# Patient Record
Sex: MALE | Race: NATIVE HAWAIIAN OR OTHER PACIFIC ISLANDER | HISPANIC OR LATINO | Employment: FULL TIME | URBAN - METROPOLITAN AREA
[De-identification: names, ages, dates, MRNs, and addresses within clinical notes are randomized per-mention and may not be internally consistent; named-entity substitution may affect disease eponyms.]

---

## 2024-10-06 ENCOUNTER — APPOINTMENT (OUTPATIENT)
Dept: GENERAL RADIOLOGY | Facility: HOSPITAL | Age: 34
End: 2024-10-06
Payer: COMMERCIAL

## 2024-10-06 ENCOUNTER — HOSPITAL ENCOUNTER (EMERGENCY)
Facility: HOSPITAL | Age: 34
Discharge: HOME | End: 2024-10-06
Attending: EMERGENCY MEDICINE | Admitting: EMERGENCY MEDICINE
Payer: COMMERCIAL

## 2024-10-06 VITALS
HEART RATE: 107 BPM | OXYGEN SATURATION: 97 % | DIASTOLIC BLOOD PRESSURE: 96 MMHG | WEIGHT: 220 LBS | RESPIRATION RATE: 18 BRPM | SYSTOLIC BLOOD PRESSURE: 150 MMHG | HEIGHT: 69 IN | BODY MASS INDEX: 32.58 KG/M2 | TEMPERATURE: 97.3 F

## 2024-10-06 DIAGNOSIS — S43.014A ANTERIOR DISLOCATION OF RIGHT SHOULDER, INITIAL ENCOUNTER: Primary | ICD-10-CM

## 2024-10-06 PROCEDURE — 25010000002 PROPOFOL 10 MG/ML EMULSION: Performed by: EMERGENCY MEDICINE

## 2024-10-06 PROCEDURE — 73030 X-RAY EXAM OF SHOULDER: CPT

## 2024-10-06 PROCEDURE — 73070 X-RAY EXAM OF ELBOW: CPT

## 2024-10-06 PROCEDURE — 25010000002 HYDROMORPHONE PER 4 MG: Performed by: EMERGENCY MEDICINE

## 2024-10-06 PROCEDURE — 96374 THER/PROPH/DIAG INJ IV PUSH: CPT

## 2024-10-06 PROCEDURE — 99285 EMERGENCY DEPT VISIT HI MDM: CPT

## 2024-10-06 RX ORDER — SODIUM CHLORIDE 0.9 % (FLUSH) 0.9 %
10 SYRINGE (ML) INJECTION AS NEEDED
Status: DISCONTINUED | OUTPATIENT
Start: 2024-10-06 | End: 2024-10-06 | Stop reason: HOSPADM

## 2024-10-06 RX ORDER — HYDROMORPHONE HYDROCHLORIDE 1 MG/ML
0.5 INJECTION, SOLUTION INTRAMUSCULAR; INTRAVENOUS; SUBCUTANEOUS ONCE
Status: COMPLETED | OUTPATIENT
Start: 2024-10-06 | End: 2024-10-06

## 2024-10-06 RX ORDER — PROPOFOL 10 MG/ML
VIAL (ML) INTRAVENOUS
Status: COMPLETED | OUTPATIENT
Start: 2024-10-06 | End: 2024-10-06

## 2024-10-06 RX ORDER — HYDROCODONE BITARTRATE AND ACETAMINOPHEN 5; 325 MG/1; MG/1
1 TABLET ORAL EVERY 6 HOURS PRN
Qty: 12 TABLET | Refills: 0 | Status: SHIPPED | OUTPATIENT
Start: 2024-10-06

## 2024-10-06 RX ADMIN — HYDROMORPHONE HYDROCHLORIDE 0.5 MG: 1 INJECTION, SOLUTION INTRAMUSCULAR; INTRAVENOUS; SUBCUTANEOUS at 14:39

## 2024-10-06 RX ADMIN — PROPOFOL 50 MG: 10 INJECTION, EMULSION INTRAVENOUS at 16:37

## 2024-10-06 RX ADMIN — PROPOFOL 50 MG: 10 INJECTION, EMULSION INTRAVENOUS at 16:39

## 2024-10-06 RX ADMIN — PROPOFOL 50 MG: 10 INJECTION, EMULSION INTRAVENOUS at 16:41

## 2024-10-06 NOTE — ED PROVIDER NOTES
EMERGENCY DEPARTMENT ENCOUNTER  Room Number:  25/25  PCP: Provider, No Known  Independent Historians: Patient and Family      HPI:  Chief Complaint: had concerns including Fall and Arm Injury.     A complete HPI/ROS/PMH/PSH/SH/FH are unobtainable due to: None    Chronic or social conditions impacting patient care (Social Determinants of Health): None      Context: Josr Guerra is a 33 y.o. male with a medical history of lateral epicondylitis who presents to the ED c/o acute fall and shoulder pain.  The patient reports that he fell just prior to arrival.  He reports severe pain to his right shoulder and right elbow.  He denies any other injury.  He reports decreased range of motion of his shoulder.      Review of prior external notes (non-ED) -and- Review of prior external test results outside of this encounter: Extensive review of the EPIC system as well as CareEverywhere reveals no prior visit notes and no prior diagnostic studies available for review.    Prescription drug monitoring program review: [facility] reviewed by Garfield Choe MD [facility] query complete and reviewed. Treatment plan to include limited course of prescribed controlled substance. Risks including addiction, benefits, and alternatives presented to patient.    PAST MEDICAL HISTORY  Active Ambulatory Problems     Diagnosis Date Noted    No Active Ambulatory Problems     Resolved Ambulatory Problems     Diagnosis Date Noted    No Resolved Ambulatory Problems     No Additional Past Medical History         PAST SURGICAL HISTORY  History reviewed. No pertinent surgical history.      FAMILY HISTORY  History reviewed. No pertinent family history.      SOCIAL HISTORY  Social History     Socioeconomic History    Marital status:          ALLERGIES  Penicillins      REVIEW OF SYSTEMS  Review of Systems  Included in HPI  All systems reviewed and negative except for those discussed in HPI.      PHYSICAL EXAM    I have reviewed the triage vital signs  and nursing notes.    ED Triage Vitals   Temp Heart Rate Resp BP SpO2   10/06/24 1342 10/06/24 1342 10/06/24 1342 10/06/24 1347 10/06/24 1342   97.3 °F (36.3 °C) 80 18 132/91 98 %      Temp src Heart Rate Source Patient Position BP Location FiO2 (%)   -- -- -- -- --              Physical Exam  GENERAL: Awake, alert, peers uncomfortable  SKIN: Warm, dry  HENT: Normocephalic, atraumatic  EYES: no scleral icterus  CV: regular rhythm, regular rate  RESPIRATORY: normal effort, lungs clear  ABDOMEN: soft, nontender, nondistended  MUSCULOSKELETAL: no deformity.  Tenderness to the right shoulder and right elbow.  No tenderness of the wrist.  No tenderness of the cervical spine.  No open wounds.  Distal pulses intact.  NEURO: alert, moves all extremities, follows commands            LAB RESULTS  No results found for this or any previous visit (from the past 24 hour(s)).      RADIOLOGY  XR Shoulder 2+ View Right    Result Date: 10/6/2024  XR SHOULDER 2+ VW RIGHT-  HISTORY: Postreduction.  COMPARISON: AP shoulder same date at 3:12 p.m.  FINDINGS: There has been reduction of anterior shoulder dislocation. There appears to be a Hill-Sachs lesion with cortical indentation of the posterolateral-superior humeral head.      Interval reduction of anterior shoulder dislocation. There appears to be a small Hill-Sachs lesion.  This report was finalized on 10/6/2024 5:43 PM by Mukesh Richards M.D on Workstation: [id number]      XR Elbow 2 View Right, XR Shoulder 2+ View Right    Result Date: 10/6/2024  RIGHT SHOULDER, RIGHT ELBOW  HISTORY: Fall with right shoulder pain and right elbow pain.  RIGHT SHOULDER: 2 VIEWS: Unfortunately, evaluation is limited in this patient and per the technologist these are the best images possible due to patient condition. These 2 images demonstrate findings suspicious for an anterior shoulder dislocation. This could also be correlated with clinical data and given limitation, CT may be the best means to  further evaluate. No fracture is evident.  RIGHT ELBOW: 4 VIEWS: Evaluation is limited as the elbow is held in significant flexion on the attempted lateral views. No direct lateral view is evident. There is no evidence for fracture or acute abnormality on these limited views. The soft tissues appear within normal limits.  This report was finalized on 10/6/2024 3:35 PM by Mukesh Richards M.D on Workstation: [id number]         MEDICATIONS GIVEN IN ER  Medications   sodium chloride 0.9 % flush 10 mL (has no administration in time range)   HYDROmorphone (DILAUDID) injection 0.5 mg (0.5 mg Intravenous Given 10/6/24 1439)   Propofol (DIPRIVAN) injection (50 mg Intravenous Given 10/6/24 1641)         ORDERS PLACED DURING THIS VISIT:  Orders Placed This Encounter   Procedures    Orthopedic Injury Treatment    Procedural Sedation    [facility] 03.  Sling & Swathe    XR Elbow 2 View Right    XR Shoulder 2+ View Right    XR Shoulder 2+ View Right    Monitor Blood Pressure    Insert Peripheral IV         OUTPATIENT MEDICATION MANAGEMENT:  Current Facility-Administered Medications Ordered in Epic   Medication Dose Route Frequency Provider Last Rate Last Admin    sodium chloride 0.9 % flush 10 mL  10 mL Intravenous PRN Garfield Choe MD         Current Outpatient Medications Ordered in Epic   Medication Sig Dispense Refill    HYDROcodone-acetaminophen (NORCO) 5-325 MG per tablet Take 1 tablet by mouth Every 6 (Six) Hours As Needed for Severe Pain. 12 tablet 0         PROCEDURES  Procedural Sedation    Date/Time: 10/6/2024 4:49 PM    Performed by: Garfield Choe MD  Authorized by: Garfield Choe MD    Consent:     Consent obtained:  Verbal and written    Consent given by:  Patient and spouse    Risks, benefits, and alternatives were discussed: yes      Risks discussed:  Allergic reaction, inadequate sedation, nausea, vomiting, respiratory compromise necessitating ventilatory assistance and intubation, prolonged  hypoxia resulting in organ damage and dysrhythmia  Universal protocol:     Procedure explained and questions answered to patient or proxy's satisfaction: yes      Imaging studies available: yes      Immediately prior to procedure, a time out was called: yes      Patient identity confirmed:  Verbally with patient, arm band and provided demographic data  Indications:     Procedure performed:  Dislocation reduction    Procedure necessitating sedation performed by:  Different physician    Intended level of sedation:  Moderate  Pre-sedation assessment:     Time since last food or drink:  >12 hours    ASA classification: class 1 - normal, healthy patient      Mouth opening:  3 or more finger widths    Thyromental distance:  4 finger widths    Mallampati score:  I - soft palate, uvula, fauces, pillars visible    Neck mobility: normal      Pre-sedation assessments completed and reviewed: airway patency, anesthesia/sedation history, cardiovascular function, hydration status, mental status, nausea/vomiting, pain level, respiratory function and temperature    Immediate pre-procedure details:     Reassessment: Patient reassessed immediately prior to procedure      Reviewed: vital signs and NPO status      Verified: bag valve mask available, emergency equipment available, intubation equipment available, IV patency confirmed, oxygen available and suction available    Procedure details (see MAR for exact dosages):     Preoxygenation:  Nasal cannula    Sedation:  Propofol    Intra-procedure monitoring:  Blood pressure monitoring, cardiac monitor, continuous pulse oximetry, continuous capnometry, frequent LOC assessments and frequent vital sign checks    Intra-procedure events: none      Total sedation time (minutes):  16  Post-procedure details:     Attendance: Constant attendance by certified staff until patient recovered      Recovery: Patient returned to pre-procedure baseline      Post-sedation assessments completed and  reviewed: airway patency, cardiovascular function, hydration status, mental status, nausea/vomiting, pain level, respiratory function and temperature      Patient is stable for discharge or admission: yes      Procedure completion:  Tolerated well, no immediate complications              PROGRESS, DATA ANALYSIS, CONSULTS, AND MEDICAL DECISION MAKING  All labs have been independently interpreted by me.  All radiology studies have been reviewed by me. All EKG's have been independently viewed and interpreted by me.  Discussion below represents my analysis of pertinent findings related to patient's condition, differential diagnosis, treatment plan and final disposition.    Differential diagnosis includes but is not limited to shoulder dislocation, humerus fracture, elbow fracture, elbow dislocation.    Clinical Scores:                   ED Course as of 10/06/24 1857   Sun Oct 06, 2024   1516 XR Shoulder 2+ View Right  My independent interpretation of the imaging study is dislocation [TR]   1628 I reviewed the plan for sedation and reduction with the patient and wife using the .  They are agreeable to proceed.  He has been n.p.o. since last night for solids. [TR]   1649 Reduction was successful. [TR]      ED Course User Index  [TR] Garfield Choe MD             AS OF 18:57 EDT VITALS:    BP - 150/96  HR - 107  TEMP - 97.3 °F (36.3 °C)  O2 SATS - 97%    COMPLEXITY OF CARE  Admission was considered but after careful review of the patient's presentation, physical examination, diagnostic results, and response to treatment the patient may be safely discharged with outpatient follow-up.      DIAGNOSIS  Final diagnoses:   Anterior dislocation of right shoulder, initial encounter         DISPOSITION  ED Disposition       ED Disposition   Discharge    Condition   Stable    Comment   --                Please note that portions of this document were completed with a voice recognition program.    Note Disclaimer: At  [facility], we believe that sharing information builds trust and better relationships. You are receiving this note because you recently visited [facility]. It is possible you will see health information before a provider has talked with you about it. This kind of information can be easy to misunderstand. To help you fully understand what it means for your health, we urge you to discuss this note with your provider.         Garfield Choe MD  10/06/24 6979

## 2024-10-06 NOTE — Clinical Note
[facility] EMERGENCY DEPARTMENT  [address] [facility] [address]  Phone: [phone]    Josr Guerra was seen and treated in our emergency department on 10/6/2024.  He may return to work on 10/08/2024.         Thank you for choosing [facility].    Purvi Gustafson RN

## 2024-10-06 NOTE — DISCHARGE INSTRUCTIONS
Do not drive or operate machinery today.  Take medication as prescribed for pain.  Call Dr. Lara for an appointment to be seen.  Wear your sling until follow-up with the orthopedist.

## 2024-10-06 NOTE — ED PROVIDER NOTES
"Upper Extremity Dislocation    Date/Time: 10/6/2024 4:45 PM    Performed by: Dyan Banegas PA  Authorized by: Garfield Choe MD  Consent: Verbal consent obtained.  Risks and benefits: risks, benefits and alternatives were discussed  Consent given by: patient  Patient understanding: patient states understanding of the procedure being performed  Patient consent: the patient's understanding of the procedure matches consent given  Procedure consent: procedure consent matches procedure scheduled  Relevant documents: relevant documents present and verified  Imaging studies: imaging studies available  Patient identity confirmed: verbally with patient  Time out: Immediately prior to procedure a \"time out\" was called to verify the correct patient, procedure, equipment, support staff and site/side marked as required.  Injury location: shoulder  Location details: right shoulder  Injury type: dislocation  Dislocation type: anterior  Hill-Sachs deformity: no  Chronicity: new  Pre-procedure neurovascular assessment: neurovascularly intact  Pre-procedure distal perfusion: normal  Pre-procedure neurological function: normal  Pre-procedure range of motion: reduced    Sedation:  Patient sedated: yes  Sedation type: moderate (conscious) sedation  Sedatives: see MAR for details (see attending physician note for procedural sedation)  Vitals: Vital signs were monitored during sedation.    Manipulation performed: yes  Reduction method: traction and counter traction and external rotation  Reduction successful: yes  X-ray confirmed reduction: yes  Immobilization: sling  Post-procedure neurovascular assessment: post-procedure neurovascularly intact  Post-procedure distal perfusion: normal  Post-procedure neurological function: normal  Post-procedure range of motion: normal  Patient tolerance: patient tolerated the procedure well with no immediate complications             Dyan Banegas PA  10/06/24 3370    "